# Patient Record
Sex: FEMALE | Race: WHITE | Employment: FULL TIME | URBAN - METROPOLITAN AREA
[De-identification: names, ages, dates, MRNs, and addresses within clinical notes are randomized per-mention and may not be internally consistent; named-entity substitution may affect disease eponyms.]

---

## 2020-08-17 ENCOUNTER — APPOINTMENT (OUTPATIENT)
Dept: CT IMAGING | Age: 60
End: 2020-08-17
Attending: EMERGENCY MEDICINE
Payer: COMMERCIAL

## 2020-08-17 ENCOUNTER — APPOINTMENT (OUTPATIENT)
Dept: GENERAL RADIOLOGY | Age: 60
End: 2020-08-17
Attending: EMERGENCY MEDICINE
Payer: COMMERCIAL

## 2020-08-17 ENCOUNTER — HOSPITAL ENCOUNTER (OUTPATIENT)
Facility: HOSPITAL | Age: 60
Setting detail: OBSERVATION
Discharge: HOME | End: 2020-08-18
Attending: EMERGENCY MEDICINE | Admitting: HOSPITALIST
Payer: COMMERCIAL

## 2020-08-17 DIAGNOSIS — R07.89 CHEST TIGHTNESS: ICD-10-CM

## 2020-08-17 DIAGNOSIS — U07.1 CLINICAL DIAGNOSIS OF COVID-19: ICD-10-CM

## 2020-08-17 DIAGNOSIS — R09.02 HYPOXIA: Primary | ICD-10-CM

## 2020-08-17 DIAGNOSIS — B34.9 VIRAL SYNDROME: ICD-10-CM

## 2020-08-17 PROBLEM — E87.1 HYPONATREMIA: Status: ACTIVE | Noted: 2020-08-17

## 2020-08-17 PROBLEM — D64.9 ANEMIA: Status: ACTIVE | Noted: 2020-08-17

## 2020-08-17 LAB
ALBUMIN SERPL-MCNC: 3.1 G/DL (ref 3.4–5)
ALBUMIN/GLOB SERPL: 0.9 {RATIO} (ref 1–2)
ALP LIVER SERPL-CCNC: 76 U/L (ref 50–130)
ALT SERPL-CCNC: 28 U/L (ref 13–56)
ANION GAP SERPL CALC-SCNC: 6 MMOL/L (ref 0–18)
AST SERPL-CCNC: 18 U/L (ref 15–37)
ATRIAL RATE: 99 BPM
BASOPHILS # BLD AUTO: 0.04 X10(3) UL (ref 0–0.2)
BASOPHILS NFR BLD AUTO: 0.4 %
BILIRUB SERPL-MCNC: 0.6 MG/DL (ref 0.1–2)
BILIRUB UR QL STRIP.AUTO: NEGATIVE
BUN BLD-MCNC: 10 MG/DL (ref 7–18)
BUN/CREAT SERPL: 16.7 (ref 10–20)
CALCIUM BLD-MCNC: 8.6 MG/DL (ref 8.5–10.1)
CHLORIDE SERPL-SCNC: 99 MMOL/L (ref 98–112)
CLARITY UR REFRACT.AUTO: CLEAR
CO2 SERPL-SCNC: 30 MMOL/L (ref 21–32)
COLOR UR AUTO: YELLOW
CREAT BLD-MCNC: 0.6 MG/DL (ref 0.55–1.02)
CRP SERPL-MCNC: 10.9 MG/DL (ref ?–0.3)
D-DIMER: 2.17 UG/ML FEU (ref ?–0.61)
DEPRECATED RDW RBC AUTO: 43 FL (ref 35.1–46.3)
EOSINOPHIL # BLD AUTO: 0.28 X10(3) UL (ref 0–0.7)
EOSINOPHIL NFR BLD AUTO: 2.8 %
ERYTHROCYTE [DISTWIDTH] IN BLOOD BY AUTOMATED COUNT: 13.1 % (ref 11–15)
GLOBULIN PLAS-MCNC: 3.4 G/DL (ref 2.8–4.4)
GLUCOSE BLD-MCNC: 95 MG/DL (ref 70–99)
GLUCOSE UR STRIP.AUTO-MCNC: NEGATIVE MG/DL
HCT VFR BLD AUTO: 34.2 % (ref 35–48)
HGB BLD-MCNC: 11.4 G/DL (ref 12–16)
IMM GRANULOCYTES # BLD AUTO: 0.04 X10(3) UL (ref 0–1)
IMM GRANULOCYTES NFR BLD: 0.4 %
KETONES UR STRIP.AUTO-MCNC: NEGATIVE MG/DL
LACTATE SERPL-SCNC: 0.8 MMOL/L (ref 0.4–2)
LDH SERPL L TO P-CCNC: 183 U/L
LYMPHOCYTES # BLD AUTO: 0.61 X10(3) UL (ref 1–4)
LYMPHOCYTES NFR BLD AUTO: 6.1 %
M PROTEIN MFR SERPL ELPH: 6.5 G/DL (ref 6.4–8.2)
MCH RBC QN AUTO: 30.1 PG (ref 26–34)
MCHC RBC AUTO-ENTMCNC: 33.3 G/DL (ref 31–37)
MCV RBC AUTO: 90.2 FL (ref 80–100)
MONOCYTES # BLD AUTO: 0.93 X10(3) UL (ref 0.1–1)
MONOCYTES NFR BLD AUTO: 9.2 %
NEUTROPHILS # BLD AUTO: 8.17 X10 (3) UL (ref 1.5–7.7)
NEUTROPHILS # BLD AUTO: 8.17 X10(3) UL (ref 1.5–7.7)
NEUTROPHILS NFR BLD AUTO: 81.1 %
NITRITE UR QL STRIP.AUTO: NEGATIVE
NT-PROBNP SERPL-MCNC: 188 PG/ML (ref ?–125)
OSMOLALITY SERPL CALC.SUM OF ELEC: 279 MOSM/KG (ref 275–295)
P AXIS: 46 DEGREES
P-R INTERVAL: 126 MS
PH UR STRIP.AUTO: 7 [PH] (ref 4.5–8)
PLATELET # BLD AUTO: 292 10(3)UL (ref 150–450)
POTASSIUM SERPL-SCNC: 3.6 MMOL/L (ref 3.5–5.1)
PROCALCITONIN SERPL-MCNC: <0.05 NG/ML (ref ?–0.16)
PROT UR STRIP.AUTO-MCNC: NEGATIVE MG/DL
Q-T INTERVAL: 330 MS
QRS DURATION: 76 MS
QTC CALCULATION (BEZET): 423 MS
R AXIS: 17 DEGREES
RBC # BLD AUTO: 3.79 X10(6)UL (ref 3.8–5.3)
SARS-COV-2 RNA RESP QL NAA+PROBE: NOT DETECTED
SODIUM SERPL-SCNC: 135 MMOL/L (ref 136–145)
SP GR UR STRIP.AUTO: 1.01 (ref 1–1.03)
T AXIS: 43 DEGREES
UROBILINOGEN UR STRIP.AUTO-MCNC: 0.2 MG/DL
VENTRICULAR RATE: 99 BPM
WBC # BLD AUTO: 10.1 X10(3) UL (ref 4–11)

## 2020-08-17 PROCEDURE — 74177 CT ABD & PELVIS W/CONTRAST: CPT | Performed by: EMERGENCY MEDICINE

## 2020-08-17 PROCEDURE — 99220 INITIAL OBSERVATION CARE,LEVL III: CPT | Performed by: INTERNAL MEDICINE

## 2020-08-17 PROCEDURE — 71275 CT ANGIOGRAPHY CHEST: CPT | Performed by: EMERGENCY MEDICINE

## 2020-08-17 PROCEDURE — 71045 X-RAY EXAM CHEST 1 VIEW: CPT | Performed by: EMERGENCY MEDICINE

## 2020-08-17 RX ORDER — NITROFURANTOIN 25; 75 MG/1; MG/1
100 CAPSULE ORAL 2 TIMES DAILY
Status: ON HOLD | COMMUNITY
End: 2020-08-18

## 2020-08-17 RX ORDER — ASPIRIN 81 MG/1
324 TABLET, CHEWABLE ORAL ONCE
Status: COMPLETED | OUTPATIENT
Start: 2020-08-17 | End: 2020-08-17

## 2020-08-17 RX ORDER — ACETAMINOPHEN 325 MG/1
650 TABLET ORAL EVERY 6 HOURS PRN
Status: DISCONTINUED | OUTPATIENT
Start: 2020-08-17 | End: 2020-08-18

## 2020-08-17 RX ORDER — ENOXAPARIN SODIUM 100 MG/ML
40 INJECTION SUBCUTANEOUS DAILY
Status: DISCONTINUED | OUTPATIENT
Start: 2020-08-17 | End: 2020-08-18

## 2020-08-17 RX ORDER — METOCLOPRAMIDE HYDROCHLORIDE 5 MG/ML
10 INJECTION INTRAMUSCULAR; INTRAVENOUS EVERY 8 HOURS PRN
Status: DISCONTINUED | OUTPATIENT
Start: 2020-08-17 | End: 2020-08-18

## 2020-08-17 RX ORDER — ASPIRIN 81 MG
TABLET, DELAYED RELEASE (ENTERIC COATED) ORAL
COMMUNITY

## 2020-08-17 RX ORDER — ONDANSETRON 2 MG/ML
4 INJECTION INTRAMUSCULAR; INTRAVENOUS EVERY 6 HOURS PRN
Status: DISCONTINUED | OUTPATIENT
Start: 2020-08-17 | End: 2020-08-18

## 2020-08-17 RX ORDER — GARLIC EXTRACT 500 MG
1 CAPSULE ORAL DAILY
COMMUNITY

## 2020-08-17 NOTE — ED INITIAL ASSESSMENT (HPI)
States urinating blood onset one week ago, saw pmd and started on antibiotic 3 days ago.  Now with lower and upper back pain no fever

## 2020-08-17 NOTE — ED PROVIDER NOTES
Patient Seen in: THE [facility] Emergency Department In [place]      History   Patient presents with:  Urinary Symptoms    Stated Complaint: Patient presented to the ED for urinary pain, back pain.  pt sent by HCP for wor*    HPI    65-year-old presents for ev air    Physical Exam    General: Patient is awake, alert in no acute distress. HEENT: Sclera are not icteric. Conjunctivae within normal limits. Mucous members are moist.   Cardiovascular: Regular rate and rhythm.   Respiratory: No distress patient spea these tests on the individual orders.    PROCALCITONIN   RAINBOW DRAW BLUE   RAINBOW DRAW LAVENDER   RAINBOW DRAW LIGHT GREEN   RAINBOW DRAW GOLD   SARS-COV-2 BY PCR (GENEXPERT)   BLOOD CULTURE   BLOOD CULTURE   BLOOD CULTURE   BLOOD CULTURE     EKG    Rate Hypoxia R09.02 8/17/2020 Unknown

## 2020-08-18 ENCOUNTER — APPOINTMENT (OUTPATIENT)
Dept: ULTRASOUND IMAGING | Facility: HOSPITAL | Age: 60
End: 2020-08-18
Attending: INTERNAL MEDICINE
Payer: COMMERCIAL

## 2020-08-18 VITALS
TEMPERATURE: 98 F | OXYGEN SATURATION: 93 % | RESPIRATION RATE: 18 BRPM | WEIGHT: 124.31 LBS | BODY MASS INDEX: 22.88 KG/M2 | HEIGHT: 62 IN | HEART RATE: 87 BPM | DIASTOLIC BLOOD PRESSURE: 72 MMHG | SYSTOLIC BLOOD PRESSURE: 134 MMHG

## 2020-08-18 LAB
ALBUMIN SERPL-MCNC: 2.6 G/DL (ref 3.4–5)
ALBUMIN/GLOB SERPL: 0.8 {RATIO} (ref 1–2)
ALP LIVER SERPL-CCNC: 71 U/L (ref 50–130)
ALT SERPL-CCNC: 23 U/L (ref 13–56)
ANION GAP SERPL CALC-SCNC: 3 MMOL/L (ref 0–18)
AST SERPL-CCNC: 13 U/L (ref 15–37)
BASOPHILS # BLD AUTO: 0.04 X10(3) UL (ref 0–0.2)
BASOPHILS NFR BLD AUTO: 0.7 %
BILIRUB SERPL-MCNC: 0.5 MG/DL (ref 0.1–2)
BUN BLD-MCNC: 6 MG/DL (ref 7–18)
BUN/CREAT SERPL: 11.5 (ref 10–20)
CALCIUM BLD-MCNC: 8.4 MG/DL (ref 8.5–10.1)
CHLORIDE SERPL-SCNC: 105 MMOL/L (ref 98–112)
CO2 SERPL-SCNC: 30 MMOL/L (ref 21–32)
CREAT BLD-MCNC: 0.52 MG/DL (ref 0.55–1.02)
CRP SERPL-MCNC: 12.1 MG/DL (ref ?–0.3)
D-DIMER: 2.95 UG/ML FEU (ref ?–0.61)
DEPRECATED HBV CORE AB SER IA-ACNC: 143.8 NG/ML (ref 18–340)
DEPRECATED HBV CORE AB SER IA-ACNC: 152.2 NG/ML (ref 18–340)
DEPRECATED RDW RBC AUTO: 44 FL (ref 35.1–46.3)
EOSINOPHIL # BLD AUTO: 0.31 X10(3) UL (ref 0–0.7)
EOSINOPHIL NFR BLD AUTO: 5.2 %
ERYTHROCYTE [DISTWIDTH] IN BLOOD BY AUTOMATED COUNT: 12.9 % (ref 11–15)
GLOBULIN PLAS-MCNC: 3.4 G/DL (ref 2.8–4.4)
GLUCOSE BLD-MCNC: 86 MG/DL (ref 70–99)
HCT VFR BLD AUTO: 33.8 % (ref 35–48)
HGB BLD-MCNC: 11 G/DL (ref 12–16)
IMM GRANULOCYTES # BLD AUTO: 0.03 X10(3) UL (ref 0–1)
IMM GRANULOCYTES NFR BLD: 0.5 %
L PNEUMO AG UR QL: NEGATIVE
LDH SERPL L TO P-CCNC: 124 U/L
LYMPHOCYTES # BLD AUTO: 0.98 X10(3) UL (ref 1–4)
LYMPHOCYTES NFR BLD AUTO: 16.3 %
M PROTEIN MFR SERPL ELPH: 6 G/DL (ref 6.4–8.2)
MCH RBC QN AUTO: 30.3 PG (ref 26–34)
MCHC RBC AUTO-ENTMCNC: 32.5 G/DL (ref 31–37)
MCV RBC AUTO: 93.1 FL (ref 80–100)
MONOCYTES # BLD AUTO: 0.9 X10(3) UL (ref 0.1–1)
MONOCYTES NFR BLD AUTO: 15 %
NEUTROPHILS # BLD AUTO: 3.75 X10 (3) UL (ref 1.5–7.7)
NEUTROPHILS # BLD AUTO: 3.75 X10(3) UL (ref 1.5–7.7)
NEUTROPHILS NFR BLD AUTO: 62.3 %
OSMOLALITY SERPL CALC.SUM OF ELEC: 283 MOSM/KG (ref 275–295)
PLATELET # BLD AUTO: 287 10(3)UL (ref 150–450)
POTASSIUM SERPL-SCNC: 3.7 MMOL/L (ref 3.5–5.1)
PROCALCITONIN SERPL-MCNC: 0.08 NG/ML (ref ?–0.16)
RBC # BLD AUTO: 3.63 X10(6)UL (ref 3.8–5.3)
SARS-COV-2 IGG SERPLBLD QL IA.RAPID: NEGATIVE
SODIUM SERPL-SCNC: 138 MMOL/L (ref 136–145)
WBC # BLD AUTO: 6 X10(3) UL (ref 4–11)

## 2020-08-18 PROCEDURE — 99217 OBSERVATION CARE DISCHARGE: CPT | Performed by: HOSPITALIST

## 2020-08-18 PROCEDURE — 93970 EXTREMITY STUDY: CPT | Performed by: INTERNAL MEDICINE

## 2020-08-18 RX ORDER — LEVOFLOXACIN 500 MG/1
500 TABLET, FILM COATED ORAL DAILY
Status: DISCONTINUED | OUTPATIENT
Start: 2020-08-18 | End: 2020-08-18

## 2020-08-18 RX ORDER — FUROSEMIDE 10 MG/ML
20 INJECTION INTRAMUSCULAR; INTRAVENOUS ONCE
Status: DISCONTINUED | OUTPATIENT
Start: 2020-08-18 | End: 2020-08-18

## 2020-08-18 RX ORDER — LEVOFLOXACIN 500 MG/1
500 TABLET, FILM COATED ORAL DAILY
Qty: 7 TABLET | Refills: 0 | Status: SHIPPED | OUTPATIENT
Start: 2020-08-18 | End: 2020-08-25

## 2020-08-18 RX ORDER — POTASSIUM CHLORIDE 20 MEQ/1
40 TABLET, EXTENDED RELEASE ORAL ONCE
Status: DISCONTINUED | OUTPATIENT
Start: 2020-08-18 | End: 2020-08-18

## 2020-08-18 NOTE — PROGRESS NOTES
08/18/20 1184   Clinical Encounter Type   Visited With Patient   Continue Visiting   (Patient anticipates going home after another test.  )   Referral To Nurse  (Patient declined completion of HPOA form at this time. )   Patient Spiritual Encounters   S

## 2020-08-18 NOTE — PROGRESS NOTES
[facility] [address]  [place], [address]  ?  08/18/20  ? Re: Jennyfer Lord  ? To Whom It May Concern:    Jennyfer Lord was admitted to [facility] from 8/17/2020 to 08/18/20.     Please excuse Jennyfer Lord from [employer]

## 2020-08-18 NOTE — PROGRESS NOTES
08/18/20 1512   Provider Notification   Reason for Communication New consult   Provider Name Lashawn Mayen MD   Method of Communication Page   Response Waiting for response   Notification Time [phone]

## 2020-08-18 NOTE — PLAN OF CARE
Problem: Patient/Family Goals  Goal: Patient/Family Long Term Goal  Description  Patient's Long Term Goal: to discharge home    Interventions:  - comply with POC  - See additional Care Plan goals for specific interventions   Outcome: Progressing  Goal: P

## 2020-08-18 NOTE — PROGRESS NOTES
NURSING ADMISSION NOTE      Patient admitted via ambulance  Oriented to room. Safety precautions initiated. Bed in low position. Call light in reach. Received pt from  ED accompanied by Patient and Staff. Pt presents as alert, pleasant.  Vital s

## 2020-08-18 NOTE — PROGRESS NOTES
08/18/20 0143   Provider Notification   Reason for Communication Other (comment)  (negative covid)   Provider Name Michelle Avila MD   Method of Communication Page   Response Waiting for response   Notification Time Brianne Bohr negative

## 2020-08-18 NOTE — PROGRESS NOTES
08/18/20 0441   Provider Notification   Reason for Communication Patient request;Order clarification   Provider Name Sean Moya MD   Method of Communication Page   Response Waiting for response   Notification Time [phone]   pt request order abx? and FY

## 2020-08-18 NOTE — PROGRESS NOTES
NURSING DISCHARGE NOTE    Discharged Home via Wheelchair. Accompanied by Support staff  Belongings Taken by patient/family. Pt discharged home. Discharge instructions reviewed with patient. Prescription sent to pharmacy.  Patient instructed to self

## 2020-08-18 NOTE — ED NOTES
Pt states she wants to go home because she has been maintaining her sats of 91-92%. RN informed pt that it is still low and should be 95% or higher. Dr Jessy Bridges made aware.

## 2020-08-18 NOTE — CONSULTS
INFECTIOUS DISEASE CONSULTATION    Krystina Villegas Patient Status:  Observation    1960 MRN [id number]   [facility] 5NW-A Attending Tyra Murry,  [address] Day # 0 PCP MOLL Take 100 mg by mouth 2 (two) times daily. , Disp: , Rfl:   Acidophilus/Pectin Oral Cap, Take 1 capsule by mouth daily. , Disp: , Rfl:   Multiple Vitamin (DAILY VITAMIN) Oral Tab, Take by mouth., Disp: , Rfl:   Albuterol Sulfate  (90 Base) MCG/ACT Merribeth Cowden Creatinine Kinase  No results for input(s): CK in the last 168 hours.     Inflammatory Markers  Recent Labs   Lab 08/17/20  1501 08/18/20  0623   CRP 10.90* 12.10*   BOAZ  --  152.2    124   DDIMER 2.17* 2.95*         Lab Results   Component Ritu

## 2020-08-18 NOTE — H&P
[facility]  History and Physical     Leonarda Jose Patient Status:  Observation    1960 MRN [id number]   [facility] 5NW-A Attending Lashell Plascencia MD   Hosp Day # 0 [facility] 40 [facility]     Chief Complaint: dysuria, back p lungs every 6 (six) hours as needed for Wheezing., Disp: 1 Inhaler, Rfl: 0        Review of Systems:   A comprehensive 14 point review of systems was completed. Pertinent positives and negatives noted in the HPI.     Physical Exam:    /63 (BP Locat 3. Mild hyponatremia   4. Mild anemia     Quality:  · DVT Prophylaxis: lovenox  · CODE status: full  · Carvajal: none    Plan of care discussed with pt and RN. ADDENDUM: COVID PCT negative, will consult ID.      Ari Huntley MD  8/17/2020

## 2020-08-20 LAB — SARS-COV-2 RNA RESP QL NAA+PROBE: NOT DETECTED

## 2020-08-23 NOTE — DISCHARGE SUMMARY
[facility]  DISCHARGE SUMMARY     Joseph Olmedo Patient Status:  Observation    1960 MRN [id number]   [facility] 5NW-A Attending No att. providers found   Hosp Day # 0 XIANG Holguin     Date of Admission: 2020  Da Anuj    Discharge Medication List:     Discharge Medications      START taking these medications      Instructions Prescription details   levofloxacin 500 MG Tabs  Commonly known as:  LEVAQUIN      Take 1 tablet (500 mg total) by mouth daily for 7 days.

## 2022-08-18 RX ORDER — MELOXICAM 15 MG/1
15 TABLET ORAL DAILY
COMMUNITY
End: 2022-09-12

## 2022-08-18 RX ORDER — SCOLOPAMINE TRANSDERMAL SYSTEM 1 MG/1
1 PATCH, EXTENDED RELEASE TRANSDERMAL ONCE
Status: CANCELLED | OUTPATIENT
Start: 2022-08-18 | End: 2022-08-18

## 2022-08-18 RX ORDER — MULTIVIT WITH MINERALS/LUTEIN
1000 TABLET ORAL DAILY
COMMUNITY

## 2022-09-09 ENCOUNTER — LAB ENCOUNTER (OUTPATIENT)
Dept: LAB | Age: 62
End: 2022-09-09
Attending: ORTHOPAEDIC SURGERY
Payer: COMMERCIAL

## 2022-09-09 ENCOUNTER — LABORATORY ENCOUNTER (OUTPATIENT)
Dept: LAB | Age: 62
End: 2022-09-09
Attending: ORTHOPAEDIC SURGERY
Payer: COMMERCIAL

## 2022-09-09 DIAGNOSIS — M48.061 LUMBAR STENOSIS: ICD-10-CM

## 2022-09-09 PROCEDURE — 87086 URINE CULTURE/COLONY COUNT: CPT

## 2022-09-10 LAB — SARS-COV-2 RNA RESP QL NAA+PROBE: NOT DETECTED

## 2022-09-12 ENCOUNTER — APPOINTMENT (OUTPATIENT)
Dept: GENERAL RADIOLOGY | Facility: HOSPITAL | Age: 62
DRG: 455 | End: 2022-09-12
Attending: ORTHOPAEDIC SURGERY

## 2022-09-12 ENCOUNTER — ANESTHESIA EVENT (OUTPATIENT)
Dept: SURGERY | Facility: HOSPITAL | Age: 62
End: 2022-09-12
Payer: COMMERCIAL

## 2022-09-12 ENCOUNTER — HOSPITAL ENCOUNTER (INPATIENT)
Facility: HOSPITAL | Age: 62
LOS: 1 days | Discharge: HOME | DRG: 455 | End: 2022-09-12
Attending: ORTHOPAEDIC SURGERY | Admitting: ORTHOPAEDIC SURGERY

## 2022-09-12 ENCOUNTER — ANESTHESIA (OUTPATIENT)
Dept: SURGERY | Facility: HOSPITAL | Age: 62
End: 2022-09-12
Payer: COMMERCIAL

## 2022-09-12 VITALS
BODY MASS INDEX: 23.58 KG/M2 | TEMPERATURE: 97 F | HEIGHT: 62 IN | RESPIRATION RATE: 16 BRPM | WEIGHT: 128.13 LBS | HEART RATE: 97 BPM | OXYGEN SATURATION: 99 % | DIASTOLIC BLOOD PRESSURE: 81 MMHG | SYSTOLIC BLOOD PRESSURE: 147 MMHG

## 2022-09-12 DIAGNOSIS — M48.061 LUMBAR STENOSIS: Primary | ICD-10-CM

## 2022-09-12 PROBLEM — D64.9 ANEMIA: Status: RESOLVED | Noted: 2020-08-17 | Resolved: 2022-09-12

## 2022-09-12 PROBLEM — R07.89 CHEST TIGHTNESS: Status: RESOLVED | Noted: 2020-08-17 | Resolved: 2022-09-12

## 2022-09-12 PROBLEM — E87.1 HYPONATREMIA: Status: RESOLVED | Noted: 2020-08-17 | Resolved: 2022-09-12

## 2022-09-12 PROBLEM — R09.02 HYPOXIA: Status: RESOLVED | Noted: 2020-08-17 | Resolved: 2022-09-12

## 2022-09-12 PROBLEM — B34.9 VIRAL SYNDROME: Status: RESOLVED | Noted: 2020-08-17 | Resolved: 2022-09-12

## 2022-09-12 PROBLEM — U07.1 CLINICAL DIAGNOSIS OF COVID-19: Status: RESOLVED | Noted: 2020-08-17 | Resolved: 2022-09-12

## 2022-09-12 PROCEDURE — 95938 SOMATOSENSORY TESTING: CPT | Performed by: ORTHOPAEDIC SURGERY

## 2022-09-12 PROCEDURE — 95870 NDL EMG LMTD STD MUSC 1 XTR: CPT | Performed by: ORTHOPAEDIC SURGERY

## 2022-09-12 PROCEDURE — 0ST20ZZ RESECTION OF LUMBAR VERTEBRAL DISC, OPEN APPROACH: ICD-10-PCS | Performed by: ORTHOPAEDIC SURGERY

## 2022-09-12 PROCEDURE — 0SG0071 FUSION OF LUMBAR VERTEBRAL JOINT WITH AUTOLOGOUS TISSUE SUBSTITUTE, POSTERIOR APPROACH, POSTERIOR COLUMN, OPEN APPROACH: ICD-10-PCS | Performed by: ORTHOPAEDIC SURGERY

## 2022-09-12 PROCEDURE — 0SG00AJ FUSION OF LUMBAR VERTEBRAL JOINT WITH INTERBODY FUSION DEVICE, POSTERIOR APPROACH, ANTERIOR COLUMN, OPEN APPROACH: ICD-10-PCS | Performed by: ORTHOPAEDIC SURGERY

## 2022-09-12 PROCEDURE — 76000 FLUOROSCOPY <1 HR PHYS/QHP: CPT | Performed by: ORTHOPAEDIC SURGERY

## 2022-09-12 PROCEDURE — 95940 IONM IN OPERATNG ROOM 15 MIN: CPT | Performed by: ORTHOPAEDIC SURGERY

## 2022-09-12 PROCEDURE — 4A11X4G MONITORING OF PERIPHERAL NERVOUS ELECTRICAL ACTIVITY, INTRAOPERATIVE, EXTERNAL APPROACH: ICD-10-PCS | Performed by: ORTHOPAEDIC SURGERY

## 2022-09-12 DEVICE — IMPLANTABLE DEVICE: Type: IMPLANTABLE DEVICE | Site: BACK | Status: FUNCTIONAL

## 2022-09-12 DEVICE — OSTEOCEL PRO LARGE BULK BUY: Type: IMPLANTABLE DEVICE | Site: BACK | Status: FUNCTIONAL

## 2022-09-12 DEVICE — RELINE MAS MOD SCREW 6.5X45 2C: Type: IMPLANTABLE DEVICE | Site: BACK | Status: FUNCTIONAL

## 2022-09-12 DEVICE — RELINE MAS RED SCREW 6.5X45 2C: Type: IMPLANTABLE DEVICE | Site: BACK | Status: FUNCTIONAL

## 2022-09-12 DEVICE — RELINE LCK SCRW 5.5 OPEN TULIP: Type: IMPLANTABLE DEVICE | Site: BACK | Status: FUNCTIONAL

## 2022-09-12 DEVICE — RELINE MAS TI ROD 5.5X50 LRDTC: Type: IMPLANTABLE DEVICE | Site: BACK | Status: FUNCTIONAL

## 2022-09-12 DEVICE — RELINE MAS MOD REDUCTION EXT: Type: IMPLANTABLE DEVICE | Site: BACK | Status: FUNCTIONAL

## 2022-09-12 DEVICE — OSTEOCEL PRO BONE MATRIX MED: Type: IMPLANTABLE DEVICE | Site: BACK | Status: FUNCTIONAL

## 2022-09-12 RX ORDER — MEPERIDINE HYDROCHLORIDE 25 MG/ML
12.5 INJECTION INTRAMUSCULAR; INTRAVENOUS; SUBCUTANEOUS AS NEEDED
Status: DISCONTINUED | OUTPATIENT
Start: 2022-09-12 | End: 2022-09-12

## 2022-09-12 RX ORDER — ROCURONIUM BROMIDE 10 MG/ML
INJECTION, SOLUTION INTRAVENOUS AS NEEDED
Status: DISCONTINUED | OUTPATIENT
Start: 2022-09-12 | End: 2022-09-12 | Stop reason: SURG

## 2022-09-12 RX ORDER — PROCHLORPERAZINE EDISYLATE 5 MG/ML
5 INJECTION INTRAMUSCULAR; INTRAVENOUS EVERY 8 HOURS PRN
Status: DISCONTINUED | OUTPATIENT
Start: 2022-09-12 | End: 2022-09-12

## 2022-09-12 RX ORDER — CEFAZOLIN SODIUM/WATER 2 G/20 ML
2 SYRINGE (ML) INTRAVENOUS ONCE
Status: COMPLETED | OUTPATIENT
Start: 2022-09-12 | End: 2022-09-12

## 2022-09-12 RX ORDER — PREDNISONE 20 MG/1
20 TABLET ORAL DAILY
Qty: 7 TABLET | Refills: 0 | Status: SHIPPED | OUTPATIENT
Start: 2022-09-12 | End: 2022-09-19

## 2022-09-12 RX ORDER — GABAPENTIN 300 MG/1
300 CAPSULE ORAL 3 TIMES DAILY
COMMUNITY
Start: 2022-08-18

## 2022-09-12 RX ORDER — DIPHENHYDRAMINE HYDROCHLORIDE 50 MG/ML
12.5 INJECTION INTRAMUSCULAR; INTRAVENOUS AS NEEDED
Status: DISCONTINUED | OUTPATIENT
Start: 2022-09-12 | End: 2022-09-12

## 2022-09-12 RX ORDER — CELECOXIB 200 MG/1
200 CAPSULE ORAL ONCE
Status: COMPLETED | OUTPATIENT
Start: 2022-09-12 | End: 2022-09-12

## 2022-09-12 RX ORDER — ONDANSETRON 2 MG/ML
4 INJECTION INTRAMUSCULAR; INTRAVENOUS EVERY 6 HOURS PRN
Status: DISCONTINUED | OUTPATIENT
Start: 2022-09-12 | End: 2022-09-12

## 2022-09-12 RX ORDER — ACETAMINOPHEN 500 MG
1000 TABLET ORAL ONCE
Status: DISCONTINUED | OUTPATIENT
Start: 2022-09-12 | End: 2022-09-12 | Stop reason: HOSPADM

## 2022-09-12 RX ORDER — SODIUM CHLORIDE, SODIUM LACTATE, POTASSIUM CHLORIDE, CALCIUM CHLORIDE 600; 310; 30; 20 MG/100ML; MG/100ML; MG/100ML; MG/100ML
INJECTION, SOLUTION INTRAVENOUS CONTINUOUS
Status: DISCONTINUED | OUTPATIENT
Start: 2022-09-12 | End: 2022-09-12

## 2022-09-12 RX ORDER — HYDROCODONE BITARTRATE AND ACETAMINOPHEN 5; 325 MG/1; MG/1
2 TABLET ORAL ONCE AS NEEDED
Status: COMPLETED | OUTPATIENT
Start: 2022-09-12 | End: 2022-09-12

## 2022-09-12 RX ORDER — HYDROMORPHONE HYDROCHLORIDE 1 MG/ML
0.6 INJECTION, SOLUTION INTRAMUSCULAR; INTRAVENOUS; SUBCUTANEOUS EVERY 5 MIN PRN
Status: DISCONTINUED | OUTPATIENT
Start: 2022-09-12 | End: 2022-09-12

## 2022-09-12 RX ORDER — DEXAMETHASONE SODIUM PHOSPHATE 4 MG/ML
VIAL (ML) INJECTION AS NEEDED
Status: DISCONTINUED | OUTPATIENT
Start: 2022-09-12 | End: 2022-09-12 | Stop reason: SURG

## 2022-09-12 RX ORDER — LIDOCAINE HYDROCHLORIDE 10 MG/ML
INJECTION, SOLUTION EPIDURAL; INFILTRATION; INTRACAUDAL; PERINEURAL AS NEEDED
Status: DISCONTINUED | OUTPATIENT
Start: 2022-09-12 | End: 2022-09-12 | Stop reason: SURG

## 2022-09-12 RX ORDER — ACETAMINOPHEN 500 MG
1000 TABLET ORAL ONCE AS NEEDED
Status: COMPLETED | OUTPATIENT
Start: 2022-09-12 | End: 2022-09-12

## 2022-09-12 RX ORDER — HYDROMORPHONE HYDROCHLORIDE 1 MG/ML
0.4 INJECTION, SOLUTION INTRAMUSCULAR; INTRAVENOUS; SUBCUTANEOUS EVERY 5 MIN PRN
Status: DISCONTINUED | OUTPATIENT
Start: 2022-09-12 | End: 2022-09-12

## 2022-09-12 RX ORDER — HYDROCODONE BITARTRATE AND ACETAMINOPHEN 5; 325 MG/1; MG/1
TABLET ORAL
Status: DISCONTINUED
Start: 2022-09-12 | End: 2022-09-12

## 2022-09-12 RX ORDER — NALOXONE HYDROCHLORIDE 0.4 MG/ML
80 INJECTION, SOLUTION INTRAMUSCULAR; INTRAVENOUS; SUBCUTANEOUS AS NEEDED
Status: DISCONTINUED | OUTPATIENT
Start: 2022-09-12 | End: 2022-09-12

## 2022-09-12 RX ORDER — HYDROMORPHONE HYDROCHLORIDE 1 MG/ML
0.2 INJECTION, SOLUTION INTRAMUSCULAR; INTRAVENOUS; SUBCUTANEOUS EVERY 5 MIN PRN
Status: DISCONTINUED | OUTPATIENT
Start: 2022-09-12 | End: 2022-09-12

## 2022-09-12 RX ORDER — LABETALOL HYDROCHLORIDE 5 MG/ML
5 INJECTION, SOLUTION INTRAVENOUS EVERY 5 MIN PRN
Status: DISCONTINUED | OUTPATIENT
Start: 2022-09-12 | End: 2022-09-12

## 2022-09-12 RX ORDER — MIDAZOLAM HYDROCHLORIDE 1 MG/ML
INJECTION INTRAMUSCULAR; INTRAVENOUS AS NEEDED
Status: DISCONTINUED | OUTPATIENT
Start: 2022-09-12 | End: 2022-09-12 | Stop reason: SURG

## 2022-09-12 RX ORDER — ONDANSETRON 2 MG/ML
4 INJECTION INTRAMUSCULAR; INTRAVENOUS ONCE
Status: COMPLETED | OUTPATIENT
Start: 2022-09-12 | End: 2022-09-12

## 2022-09-12 RX ORDER — HYDROCODONE BITARTRATE AND ACETAMINOPHEN 10; 325 MG/1; MG/1
1 TABLET ORAL EVERY 6 HOURS PRN
COMMUNITY
Start: 2022-09-02

## 2022-09-12 RX ORDER — HYDROCODONE BITARTRATE AND ACETAMINOPHEN 5; 325 MG/1; MG/1
1 TABLET ORAL ONCE AS NEEDED
Status: COMPLETED | OUTPATIENT
Start: 2022-09-12 | End: 2022-09-12

## 2022-09-12 RX ORDER — ACETAMINOPHEN 325 MG/1
650 TABLET ORAL ONCE
Status: DISCONTINUED | OUTPATIENT
Start: 2022-09-12 | End: 2022-09-12

## 2022-09-12 RX ORDER — METHOCARBAMOL 500 MG/1
1 TABLET, FILM COATED ORAL 3 TIMES DAILY
COMMUNITY
Start: 2022-09-02

## 2022-09-12 RX ADMIN — MIDAZOLAM HYDROCHLORIDE 2 MG: 1 INJECTION INTRAMUSCULAR; INTRAVENOUS at 10:55:00

## 2022-09-12 RX ADMIN — SODIUM CHLORIDE, SODIUM LACTATE, POTASSIUM CHLORIDE, CALCIUM CHLORIDE: 600; 310; 30; 20 INJECTION, SOLUTION INTRAVENOUS at 12:58:00

## 2022-09-12 RX ADMIN — CEFAZOLIN SODIUM/WATER 2 G: 2 G/20 ML SYRINGE (ML) INTRAVENOUS at 11:06:00

## 2022-09-12 RX ADMIN — SODIUM CHLORIDE, SODIUM LACTATE, POTASSIUM CHLORIDE, CALCIUM CHLORIDE: 600; 310; 30; 20 INJECTION, SOLUTION INTRAVENOUS at 11:15:00

## 2022-09-12 RX ADMIN — SODIUM CHLORIDE, SODIUM LACTATE, POTASSIUM CHLORIDE, CALCIUM CHLORIDE: 600; 310; 30; 20 INJECTION, SOLUTION INTRAVENOUS at 10:55:00

## 2022-09-12 RX ADMIN — LIDOCAINE HYDROCHLORIDE 100 MG: 10 INJECTION, SOLUTION EPIDURAL; INFILTRATION; INTRACAUDAL; PERINEURAL at 11:00:00

## 2022-09-12 RX ADMIN — DEXAMETHASONE SODIUM PHOSPHATE 8 MG: 4 MG/ML VIAL (ML) INJECTION at 11:15:00

## 2022-09-12 RX ADMIN — ROCURONIUM BROMIDE 10 MG: 10 INJECTION, SOLUTION INTRAVENOUS at 11:00:00

## 2022-09-12 NOTE — BRIEF OP NOTE
Pre-Operative Diagnosis: SPINAL STENOSIS     Post-Operative Diagnosis: SPINAL STENOSIS      Procedure Performed:   LUMBAR 4-LUMBAR 5 TRANSFORAMINAL LUMBAR INTERBODY FUSION WITH INSTRUMENTATIONS, INTRAOPERATIVE NEURO MONITORING    Surgeon(s) and Role:     * Jesica Palomino MD - Primary    Assistant(s):  PA: Lady Aden     Surgical Findings: above     Specimen: none     Estimated Blood Loss: Blood Output: 50 mL (9/12/2022 12:19 PM)      Dictation Number:      PATRICIA Yates  9/12/2022  1:08 PM

## 2022-09-12 NOTE — ANESTHESIA PROCEDURE NOTES
Airway  Date/Time: 9/12/2022 11:00 AM  Urgency: elective    Airway not difficult    General Information and Staff    Patient location during procedure: OR  Anesthesiologist: Matt Nicole MD  Performed: anesthesiologist     Indications and Patient Condition  Indications for airway management: anesthesia  Spontaneous Ventilation: absent  Sedation level: deep  Preoxygenated: yes  Patient position: sniffing  Mask difficulty assessment: 1 - vent by mask    Final Airway Details  Final airway type: endotracheal airway      Successful airway: ETT  Cuffed: yes   Successful intubation technique: direct laryngoscopy  Endotracheal tube insertion site: oral  Blade: Madonna  Blade size: #3  ETT size (mm): 7.0    Cormack-Lehane Classification: grade IIA - partial view of glottis  Placement verified by: chest auscultation and capnometry   Cuff volume (mL): 11  Measured from: lips  ETT to lips (cm): 22  Number of attempts at approach: 1  Number of other approaches attempted: 0

## 2022-09-20 NOTE — OPERATIVE REPORT
[facility]    PATIENT'S NAME: Wiliam Baez   ATTENDING PHYSICIAN: Phillip Joshua M.D. OPERATING PHYSICIAN: Phillip Joshua M.D. PATIENT ACCOUNT#:   [de-identified]    LOCATION:  [facility] 4 EDWP 10  MEDICAL RECORD #:   [id number]       YOB: 1960  ADMISSION DATE:       09/12/2022      OPERATION DATE:  09/12/2022    OPERATIVE REPORT      PREOPERATIVE DIAGNOSIS:  L4-5 stenosis, kyphosis, and spondylolisthesis. POSTOPERATIVE DIAGNOSIS:    PROCEDURE PERFORMED:  L4-5 decompression, interbody reconstruction, and fusion. INDICATIONS:  The patient had failed reasonable conservative measures which are outlined in the patient's clinic medical record. Physical therapy, medical management, injections, alternative treatment are among those offered unless motor deficits are progressive. Indications for surgery are based on scientific literature and CAMILA guidelines. A thorough meeting with the patient and at least one key caregiver was had. The risks and benefits were discussed in significant detail. The risks include, but are not limited to, bleeding, infection, spinal leaks, nerve injuries, hardware failure/migration, pseudarthrosis, adjacent segment disease, and need for further surgery. I have gone over the operation using models, diagrams, and the patient's own imaging studies. Additional written and digital sources were provided. No guarantees were made. ASSISTANT:  Soham Hart. ADDISON Coy. A skilled and experienced assistant was required for the entire surgical procedure. As a lumbar spinal reconstruction, the procedure is done in immediate proximity to critical neural elements and is done in close proximity to the critical vascular and visceral structures. Much of the surgery is done in and around the cauda equine and its exiting nerves. The complex reconstructive nature of the procedure requires application of screws, rods, and interbody devices.   Any assistance, including, but not limited to, retraction, suction, and other means of facilitation of the procedure requires an individual with substantial postgraduate training and substantial spinal surgical experience. ESTIMATED BLOOD LOSS:  50 mL. DESCRIPTION OF PROCEDURE:  After obtaining informed consent, the patient was taken to the operating room. SCDs and FARIDA hose were applied. Preoperative antibiotics were delivered. Patient was placed in the prone position. Neuromonitoring leads were put in place and baselines were achieved. An internal twitch test verified the patient had 4 out of 4 twitches. A physician was confirmed as evaluating all neuromonitoring data. All bony prominences were padded. The back was sterilely prepped and draped. AP fluoroscopy was wheeled in. We marked pedicles at all levels outlined above bilaterally. Two separate incisions 1-1/2 fingerbreadths lateral to this approximately an inch and a half long were made with a larger incision on the patient's more symptomatic side. This was done with a #10 blade. Bovie electrocautery was used for hemostasis. Dissection was taken down to the level of the fascia. Fascia was split in paraspinal fashion bilaterally. I-PAS needles were utilized with live neuromonitoring in place. Baselines had been achieved. We had confirmed 4 out of 4 twitches with our colleagues and neuromonitoring. Under fluoroscopic guidance, we targeted the lateral aspect of the pedicles bilaterally. These I-PAS needles were then impacted medially and then checked under lateral fluoroscopy for being in appropriate position. Bone marrow was aspirated by placing a needle through the skin and through the periosteum and into the pedicle. A 10 mL syringe was used to aspirate several milliliters of bone marrow aspirate. The needle was removed with a twisting motion. All I-PAS needles were found to be well above acceptable thresholds. Styluses were removed.   K-wires were applied and advanced. Inner styluses were removed as well. Both sides had dilators applied and had appropriate taps applied. We placed a modular screw with hoop shims attached over the K-wire. Both taps were applied with live neuromonitoring and found to have thresholds well above acceptable limits. We repeated the same steps at distal levels without difficulty. The retractor apparatus was placed over the hoop shims. A medial retractor was sized and applied. All tissues were retracted out of harm's way with regards to muscle in the paraspinal plane and an articulating arm was attached to the retractor. Bovie electrocautery was used to remove remaining tissue over the pars and facet. Pre-operative measurements documenting a mismatch between pelvic incidence and lumbar lordosis was made, thus documenting clinically significant kyphosis. As a result, lumbar osteotomies are required to improve the patient's overall sagittal balance. Our attention first turned to the L4-5 level. The facet was osteotomized and resected. A bur was used to remove additional bone. Pars was resected as well. We identified the lateral ligamentum flavum and resected that with a Cano ball in place over the dura and a 4 and 5-mm Kerrison. Pars was resected as well. The area was further osteotomized from cranial to caudal pedicle using bur, Kerrison rongeurs, and other instruments. Neural elements were not harmed during this process. The osteotomy facilitated reduction of the patient's kyphosis by allowing for compression via pedicle screws at the conclusion of the case. A bur was used to remove additional bone. We identified the lateral ligamentum flavum and resected that with a Cano ball in place over the dura and a 4 and 5-mm Kerrison. Neural elements were identified and defined for a standard posterior interbody fusion.     Significant spinal stenosis was identified consistent with the patient's radiculopathy and deficits. We then were required to perform a technically demanding decompression using fine microcurettes, Kerrison rongeurs, microinstruments, and magnification. Despite adherence of the compressive pathology to neural elements, we were able to define tissue planes to help facilitate release of neurocompressive pathology. The procedure required additional time and technique beyond that of the interbody fusion itself. Contralateral decompression was undertaken, thus creating a bilateral decompression and bilateral microforaminotomy and hemilaminotomy using undercutting technique. With undercutting technique and a Cano ball in place, we excised ligamentum flavum through the bone at the cranial foramina as needed, facilitating a serial cranial decompression and undercutting  microforaminotomy and laminectomy. This was done in an undercutting fashion as well and done to examine the central and contralateral neural elements facilitating central bilateral decompression as well. The traversing nerve root was identified and thoroughly decompressed. Undercutting decompression was undertaken. An extraforaminal and transfacet/transpedicular decompression was utilized to perform a thorough decompression of the exiting nerve root and remove redundant annulus and any herniated nucleus pulposus. Our attention then turned to utilization of the separate contralateral incision. With a retractor in place, we localized the far lateral region of the level. We examined areas more farther laterally to facilitate a transpedicular/transforaminal decompression. Nerve root retractor was applied. We were able to retract the traversing nerve root over. Annulotomy was performed. Farther lateral structures and paraspinal muscles were dissected away from the area of the transverse process and farther anteriorly. Psoas muscle tissues were also dissected free in the far lateral plane.       Significant lateral compressive disease was identified consistent with the patient's radiculopathy and deficits. We then were required to perform a technically demanding decompression using fine microcurettes, Kerrison rongeurs, microinstruments, and magnification. Despite adherence of the compressive pathology to neural elements, we were able to define tissue planes to help facilitate release of neurocompressive pathology. The procedure required additional time and technique beyond that of the interbody fusion itself. Curettes, edith, and pituitary rongeurs were used to remove the rest of the remaining disc in order to perform lateral extraforaminal and transpedicular discectomy. Endplates were prepared. Curettes and osteotomes were used to perform bony anterior release and osteotomize any anterior column ossification. We applied some distraction across the pedicle screws. We sized for a cage. Allograft complex was impacted into the lateral disc space, and the cage was impacted as well with nerve roots safely out of harm's way. Utilizing the incisions and fascial openings previously utilized for the osteotomy and decompression we proceeded with a posterolateral fusion at the L4-5 levels. Posterior elements were decorticated. Allograft and local bone graft were applied. The wounds were thoroughly irrigated. Hemostasis was obtained. Tulip heads were placed on the contralateral side. Then, with dilators in place, the screws were placed. Instrumentation was placed on the right over K-wires in standard technique and fluoroscopy. Screws were advanced under fluoroscopy. All screws were tested using real-time neuromonitoring. Thresholds were above acceptable values. Rods and top tighteners were applied bilaterally, and final tightening was applied with compression devices to make use of the osteotomy, thereby reducing kyphosis and pelvic incidence and lordosis mismatch.   X-rays confirmed appropriate localization of our instrumentation. Valsalva maneuver confirmed that there was no spinal leak. The wound was thoroughly irrigated. Hemostasis was maintained. The fascia was reapproximated bilaterally using 1-0 Vicryl. A 2-0 Vicryl was used for subcutaneous tissues. A running 3-0 subcuticular stitch was applied. Steri-Strips were applied. Sterile dressings were applied. The patient was extubated and taken to the recovery room in stable condition and was neurologically intact on arrival and had improvement of leg pain. SSEPs remained at their baseline. Needle and Ray-Negar counts were correct at the conclusion of the case.     Dictated By Cheryle Linger, M.D.  d: 09/20/2022 16:43:07  t: 09/20/2022 18:06:58  [facility] [id number]  AAL/

## 2025-03-11 ENCOUNTER — ORDER TRANSCRIPTION (OUTPATIENT)
Dept: ADMINISTRATIVE | Facility: HOSPITAL | Age: 65
End: 2025-03-11

## 2025-03-11 DIAGNOSIS — Z13.6 SCREENING FOR CARDIOVASCULAR CONDITION: Primary | ICD-10-CM

## 2025-03-30 ENCOUNTER — HOSPITAL ENCOUNTER (OUTPATIENT)
Dept: CT IMAGING | Age: 65
Discharge: HOME | End: 2025-03-30
Attending: FAMILY MEDICINE

## 2025-03-30 DIAGNOSIS — Z13.6 SCREENING FOR CARDIOVASCULAR CONDITION: ICD-10-CM

## (undated) DEVICE — DERMA+FLEX TISSUE ADHESIVE

## (undated) DEVICE — FLOSEAL HEMOSTATIC MATRIX, 5ML: Brand: FLOSEAL HEMOSTATIC MATRIX

## (undated) DEVICE — C-ARMOR C-ARM EQUIPMENT COVERS CLEAR STERILE UNIVERSAL FIT 12 PER CASE: Brand: C-ARMOR

## (undated) DEVICE — C-ARM: Brand: UNBRANDED

## (undated) DEVICE — WIRE FX PRCPT KRSH BVL BLNT

## (undated) DEVICE — LAMINECTOMY CDS: Brand: MEDLINE INDUSTRIES, INC.

## (undated) DEVICE — 3M™ TEGADERM™ TRANSPARENT FILM DRESSING, 1626W, 4 IN X 4-3/4 IN (10 CM X 12 CM), 50 EACH/CARTON, 4 CARTON/CASE: Brand: 3M™ TEGADERM™

## (undated) DEVICE — LIGHT HANDLE

## (undated) DEVICE — RELINE MAS BLADE FASCIAL SPLIT

## (undated) DEVICE — SUT VICRYL 2-0 FSL J589H

## (undated) DEVICE — UNDYED BRAIDED (POLYGLACTIN 910), SYNTHETIC ABSORBABLE SUTURE: Brand: COATED VICRYL

## (undated) DEVICE — SOLUTION  .9 1000ML BTL

## (undated) DEVICE — KIT POSITIONING PROAXIS PRONEV

## (undated) DEVICE — WRAP COOLING BACK W/NO PILLOW

## (undated) DEVICE — STERILE POLYISOPRENE POWDER-FREE SURGICAL GLOVES: Brand: PROTEXIS

## (undated) DEVICE — Device: Brand: INTELLICART™

## (undated) DEVICE — 11.1-M5 MULTIMODALITY KIT 5

## (undated) DEVICE — 3M(TM) TEGADERM(TM) TRANSPARENT FILM DRESSING FRAME STYLE 1628: Brand: 3M™ TEGADERM™

## (undated) DEVICE — RELINE POWER

## (undated) DEVICE — 3.0MM PRECISION ROUND

## (undated) DEVICE — SUT VICRYL 1 OS-6 J535H

## (undated) DEVICE — PEN SKIN MARKING REG TIP VIOLT

## (undated) DEVICE — COVER,TABLE,44X90,STERILE: Brand: MEDLINE

## (undated) DEVICE — ELECTRODE EDGE PENCIL 10FT

## (undated) DEVICE — SLEEVE KENDALL SCD EXPRESS MED

## (undated) DEVICE — NV CLIP DSC&IN-LINE ACTIVATOR

## (undated) DEVICE — NV I-PAS III DIAMOND TIP

## (undated) DEVICE — 450 ML BOTTLE OF 0.05% CHLORHEXIDINE GLUCONATE IN 99.95% STERILE WATER FOR IRRIGATION, USP AND APPLICATOR.: Brand: IRRISEPT ANTIMICROBIAL WOUND LAVAGE

## (undated) DEVICE — MAXCESS MAS TLIF 2 KIT ACCESS

## (undated) DEVICE — GRAFT DELIVERY SYS MODULE

## (undated) DEVICE — CLOSURE EXOFIN 1.0ML

## (undated) NOTE — LETTER
OUTSIDE TESTING RESULT REQUEST     IMPORTANT: FOR YOUR IMMEDIATE ATTENTION  Please FAX all test results listed below to: [phone]     Testing already done on or about: ** at 1000     * * * * If testing is NOT complete, arrange with patient A.S.A.P. * * * *      Patient Name: Marco Antonio Orantes  Surgery Date: 2022  CSN: [id number]  Medical Record: [id number]   : 1960 - A: 60 y      Sex: female  Surgeon(s):  Bridget Bland MD  Procedure: LUMBAR 4-LUMBAR 5 TRANSFORAMINAL LUMBAR INTERBODY FUSION WITH INSTRUMENTATIONS  Anesthesia Type: General     Surgeon: Bridget Bland MD     The following Testing and Time Line are REQUIRED PER ANESTHESIA     EKG READ AND SIGNED WITHIN   90 days  CBC [with Differential & Platelets] within  90 days  CMP (requires 4 hour fast) within  90 days  PT/INR within  30 days  PTT within  30 days  UA with Reflex to Culture within  30 days  MSSA/MRSA Nasal screening within 30 days      Thank You,   Sent by: GIULIANA Sam RN